# Patient Record
Sex: MALE | Race: WHITE
[De-identification: names, ages, dates, MRNs, and addresses within clinical notes are randomized per-mention and may not be internally consistent; named-entity substitution may affect disease eponyms.]

---

## 2018-09-25 ENCOUNTER — HOSPITAL ENCOUNTER (OUTPATIENT)
Dept: HOSPITAL 62 - ER | Age: 68
LOS: 1 days | Discharge: HOME | End: 2018-09-26
Attending: INTERNAL MEDICINE
Payer: MEDICARE

## 2018-09-25 DIAGNOSIS — Z87.891: ICD-10-CM

## 2018-09-25 DIAGNOSIS — S90.414A: ICD-10-CM

## 2018-09-25 DIAGNOSIS — E78.5: ICD-10-CM

## 2018-09-25 DIAGNOSIS — I25.2: ICD-10-CM

## 2018-09-25 DIAGNOSIS — Z79.84: ICD-10-CM
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
## 2018-09-25 DIAGNOSIS — I10: ICD-10-CM

## 2018-09-25 DIAGNOSIS — Z72.89: ICD-10-CM

## 2018-09-25 DIAGNOSIS — S42.252A: Primary | ICD-10-CM

## 2018-09-25 DIAGNOSIS — G62.9: ICD-10-CM

## 2018-09-25 DIAGNOSIS — E11.9: ICD-10-CM
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
## 2018-09-25 PROCEDURE — 73630 X-RAY EXAM OF FOOT: CPT

## 2018-09-25 PROCEDURE — 84484 ASSAY OF TROPONIN QUANT: CPT

## 2018-09-25 PROCEDURE — 85025 COMPLETE CBC W/AUTO DIFF WBC: CPT

## 2018-09-25 PROCEDURE — 73020 X-RAY EXAM OF SHOULDER: CPT

## 2018-09-25 PROCEDURE — 93010 ELECTROCARDIOGRAM REPORT: CPT

## 2018-09-25 PROCEDURE — 96374 THER/PROPH/DIAG INJ IV PUSH: CPT

## 2018-09-25 PROCEDURE — 80307 DRUG TEST PRSMV CHEM ANLYZR: CPT

## 2018-09-25 PROCEDURE — 73030 X-RAY EXAM OF SHOULDER: CPT

## 2018-09-25 PROCEDURE — 80048 BASIC METABOLIC PNL TOTAL CA: CPT

## 2018-09-25 PROCEDURE — 01620 ANES CLSD PX SHOULDER JOINT: CPT

## 2018-09-25 PROCEDURE — 93005 ELECTROCARDIOGRAM TRACING: CPT

## 2018-09-25 PROCEDURE — 36415 COLL VENOUS BLD VENIPUNCTURE: CPT

## 2018-09-25 PROCEDURE — 99152 MOD SED SAME PHYS/QHP 5/>YRS: CPT

## 2018-09-25 PROCEDURE — 85730 THROMBOPLASTIN TIME PARTIAL: CPT

## 2018-09-25 PROCEDURE — 96375 TX/PRO/DX INJ NEW DRUG ADDON: CPT

## 2018-09-25 PROCEDURE — 23665 CLTX SHO DSLC FX GR HMRL TBR: CPT

## 2018-09-25 PROCEDURE — 85610 PROTHROMBIN TIME: CPT

## 2018-09-25 PROCEDURE — 82553 CREATINE MB FRACTION: CPT

## 2018-09-25 PROCEDURE — 99285 EMERGENCY DEPT VISIT HI MDM: CPT

## 2018-09-25 PROCEDURE — L3650 SO 8 ABD RESTRAINT PRE OTS: HCPCS

## 2018-09-26 VITALS — SYSTOLIC BLOOD PRESSURE: 153 MMHG | DIASTOLIC BLOOD PRESSURE: 95 MMHG

## 2018-09-26 LAB
ADD MANUAL DIFF: NO
ADD MANUAL DIFF: NO
ANION GAP SERPL CALC-SCNC: 13 MMOL/L (ref 5–19)
ANION GAP SERPL CALC-SCNC: 7 MMOL/L (ref 5–19)
APTT BLD: 24.6 SEC (ref 23.5–35.8)
BASOPHILS # BLD AUTO: 0 10^3/UL (ref 0–0.2)
BASOPHILS # BLD AUTO: 0.1 10^3/UL (ref 0–0.2)
BASOPHILS NFR BLD AUTO: 0.3 % (ref 0–2)
BASOPHILS NFR BLD AUTO: 0.7 % (ref 0–2)
BUN SERPL-MCNC: 25 MG/DL (ref 7–20)
BUN SERPL-MCNC: 27 MG/DL (ref 7–20)
CALCIUM: 10 MG/DL (ref 8.4–10.2)
CALCIUM: 9.4 MG/DL (ref 8.4–10.2)
CHLORIDE SERPL-SCNC: 103 MMOL/L (ref 98–107)
CHLORIDE SERPL-SCNC: 106 MMOL/L (ref 98–107)
CK MB SERPL-MCNC: 1.94 NG/ML (ref ?–4.55)
CK MB SERPL-MCNC: 3 NG/ML (ref ?–4.55)
CO2 SERPL-SCNC: 23 MMOL/L (ref 22–30)
CO2 SERPL-SCNC: 25 MMOL/L (ref 22–30)
EOSINOPHIL # BLD AUTO: 0.1 10^3/UL (ref 0–0.6)
EOSINOPHIL # BLD AUTO: 0.2 10^3/UL (ref 0–0.6)
EOSINOPHIL NFR BLD AUTO: 1.5 % (ref 0–6)
EOSINOPHIL NFR BLD AUTO: 2.1 % (ref 0–6)
ERYTHROCYTE [DISTWIDTH] IN BLOOD BY AUTOMATED COUNT: 13.4 % (ref 11.5–14)
ERYTHROCYTE [DISTWIDTH] IN BLOOD BY AUTOMATED COUNT: 13.6 % (ref 11.5–14)
ETHANOL SERPL-MCNC: 113 MG/DL
GLUCOSE SERPL-MCNC: 108 MG/DL (ref 75–110)
GLUCOSE SERPL-MCNC: 122 MG/DL (ref 75–110)
HCT VFR BLD CALC: 36.8 % (ref 37.9–51)
HCT VFR BLD CALC: 38.7 % (ref 37.9–51)
HGB BLD-MCNC: 12.8 G/DL (ref 13.5–17)
HGB BLD-MCNC: 13.4 G/DL (ref 13.5–17)
INR PPP: 0.87
LYMPHOCYTES # BLD AUTO: 1.6 10^3/UL (ref 0.5–4.7)
LYMPHOCYTES # BLD AUTO: 2 10^3/UL (ref 0.5–4.7)
LYMPHOCYTES NFR BLD AUTO: 16.7 % (ref 13–45)
LYMPHOCYTES NFR BLD AUTO: 21 % (ref 13–45)
MCH RBC QN AUTO: 31.2 PG (ref 27–33.4)
MCH RBC QN AUTO: 31.6 PG (ref 27–33.4)
MCHC RBC AUTO-ENTMCNC: 34.5 G/DL (ref 32–36)
MCHC RBC AUTO-ENTMCNC: 34.8 G/DL (ref 32–36)
MCV RBC AUTO: 90 FL (ref 80–97)
MCV RBC AUTO: 91 FL (ref 80–97)
MONOCYTES # BLD AUTO: 0.8 10^3/UL (ref 0.1–1.4)
MONOCYTES # BLD AUTO: 0.8 10^3/UL (ref 0.1–1.4)
MONOCYTES NFR BLD AUTO: 8.1 % (ref 3–13)
MONOCYTES NFR BLD AUTO: 8.6 % (ref 3–13)
NEUTROPHILS # BLD AUTO: 6.6 10^3/UL (ref 1.7–8.2)
NEUTROPHILS # BLD AUTO: 6.8 10^3/UL (ref 1.7–8.2)
NEUTS SEG NFR BLD AUTO: 68.1 % (ref 42–78)
NEUTS SEG NFR BLD AUTO: 72.9 % (ref 42–78)
PLATELET # BLD: 240 10^3/UL (ref 150–450)
PLATELET # BLD: 250 10^3/UL (ref 150–450)
POTASSIUM SERPL-SCNC: 3.8 MMOL/L (ref 3.6–5)
POTASSIUM SERPL-SCNC: 4 MMOL/L (ref 3.6–5)
PROTHROMBIN TIME: 12.3 SEC (ref 11.4–15.4)
RBC # BLD AUTO: 4.06 10^6/UL (ref 4.35–5.55)
RBC # BLD AUTO: 4.28 10^6/UL (ref 4.35–5.55)
SODIUM SERPL-SCNC: 138.3 MMOL/L (ref 137–145)
SODIUM SERPL-SCNC: 139.1 MMOL/L (ref 137–145)
TOTAL CELLS COUNTED % (AUTO): 100 %
TOTAL CELLS COUNTED % (AUTO): 100 %
TROPONIN I SERPL-MCNC: < 0.012 NG/ML
TROPONIN I SERPL-MCNC: < 0.012 NG/ML
WBC # BLD AUTO: 9.4 10^3/UL (ref 4–10.5)
WBC # BLD AUTO: 9.7 10^3/UL (ref 4–10.5)

## 2018-09-26 RX ADMIN — SODIUM CHLORIDE PRN MLS/HR: 9 INJECTION, SOLUTION INTRAVENOUS at 05:57

## 2018-09-26 RX ADMIN — SODIUM CHLORIDE PRN MLS/HR: 9 INJECTION, SOLUTION INTRAVENOUS at 01:49

## 2018-09-26 NOTE — DISCHARGE SUMMARY
Discharge Summary (SDC)





- Discharge


Final Diagnosis: 





Status post close reduction of left shoulder dislocation with greater 

tuberosity fracture.


Date of Surgery: 09/26/18


Discharge Date: 09/26/18


Condition: Good


Treatment or Instructions: 


Patient was discharged from the ER to the OR for closed reduction and then sent 

to the amatory surgery center where he will be discharged home.


Patient instructed to keep the sling on.  Okay to remove but to avoid range of 

motion exercises.  Told to bend forward to able to get the armpit and put on 

insurance.  Okay to do pendulum exercises only.


Patient lives in [place] and will be traveling back and 4 days.  Instructed to 

follow-up with orthopedics in 1-2 weeks.


Instructed to be nonweightbearing in the meantime.


Prescriptions: 


Oxycodone HCl/Acetaminophen [Percocet 5-325 mg Tablet] 1 - 2 tab PO ASDIR PRN #

40 tablet


 PRN Reason: 


Discharge Diet: As Tolerated


Respiratory Treatments at Home: Deep Breathing/Coughing


Discharge Activity: No Lifting/Push/Pulling, Other - Wear the sling at all 

times except for showers.


Home Care Assistance: None Needed


Report the Following to Your Physician Immediately: Shortness of Breath, 

Vomiting, Increase in Pain, Fever over 101 Degrees, Redness, Swelling, Warmth, 

Increased Soreness

## 2018-09-26 NOTE — RADIOLOGY REPORT (SQ)
EXAM DESCRIPTION:  SHOULDER LEFT 2 OR MORE VIEWS; NO CHG FLUORO



COMPLETED DATE/TIME:  9/26/2018 1:31 pm



REASON FOR STUDY:  REDUCTION LEFT SHOULDER



COMPARISON:  Left shoulder films 9/25/2018, 9/26/2018



FLUOROSCOPY TIME:  0.1 minutes

5 digital C-arm images saved to PACS.



TECHNIQUE:  Intra-operative images acquired during surgical procedure to evaluate progress.

NUMBER OF IMAGES: 5 digital C-arm images



LIMITATIONS:  None.



FINDINGS:  5 digital C-arm images demonstrate normal alignment at the left glenohumeral joint.  There
 is an acute fracture at the left humeral head greater tuberosity, minimally displaced.  Limited view
 of the scapula and clavicle in the field of view unremarkable.  Please see the operative report for 
further details



IMPRESSION:  Intra procedural imaging and fluoro



COMMENT:  Quality :  Final reports for procedures using fluoroscopy that document radiation exp
osure indices, or exposure time and number of fluorographic images (if radiation exposure indices are
 not available)

Please consult full operative report of the attending physician for description of the procedure.



TECHNICAL DOCUMENTATION:  JOB ID:  [id number]

 2011 [employer]- All Rights Reserved



Reading location - IP/workstation name: [facility]

## 2018-09-26 NOTE — EKG REPORT
SEVERITY:- OTHERWISE NORMAL ECG -

SINUS RHYTHM

ATRIAL PREMATURE COMPLEX

:

Confirmed by: Lolita Draper MD 26-Sep-2018 07:58:33

## 2018-09-26 NOTE — RADIOLOGY REPORT (SQ)
3 VIEWS OF THE RIGHT FOOT



HISTORY: Fifth toe laceration. Fall.



COMPARISON: None.



FINDINGS/IMPRESSION:



Normal bone mineralization.

No acute fracture or malalignment.

Joint spaces are preserved.

Mild soft tissue swelling of the fifth toe.

## 2018-09-26 NOTE — OPERATIVE REPORT
Operative Report


DATE OF SURGERY: 09/26/18


PREOPERATIVE DIAGNOSIS: Left shoulder fracture dislocation


POSTOPERATIVE DIAGNOSIS: Same


OPERATION: Closed reduction of left shoulder dislocation with greater 

tuberosity fracture


SURGEON: TATA ZAMORA


ANESTHESIA: LMAC


TISSUE REMOVED OR ALTERED: None


COMPLICATIONS: 





None


ESTIMATED BLOOD LOSS: None


INTRAOPERATIVE FINDINGS: As above


PROCEDURE: 





Patient was brought to the operating room and was given MAC sedation.  Once 

sedation was given timeout was done identifying the left shoulder as the 

correct site.  Once anesthesia gave me the okay patient was placed at 30 

degrees head above bed.  We placed a sheet under his armpit and countertraction 

done by nurse.  C-arm was brought in and take images showing that the shoulder 

was still reduced inferiorly and anteriorly.  Greater tuberosity fracture was 

noted.  While the nurse was holding the she I was able to then do manual 

traction and able to reduce it and confirmed on C arm both the AP and scapular 

Y images.  The fracture reduced nicely.  Patient was placed in a sling and 

secured in reimage to ensure that shoulder still in place.  Patient then was 

awoken and then sent to PACU in a stable condition.

## 2018-09-26 NOTE — RADIOLOGY REPORT (SQ)
EXAM DESCRIPTION:  SHOULDER LEFT 2 OR MORE VIEWS; NO CHG FLUORO



COMPLETED DATE/TIME:  9/26/2018 1:31 pm



REASON FOR STUDY:  REDUCTION LEFT SHOULDER



COMPARISON:  Left shoulder films 9/25/2018, 9/26/2018



FLUOROSCOPY TIME:  0.1 minutes

5 digital C-arm images saved to PACS.



TECHNIQUE:  Intra-operative images acquired during surgical procedure to evaluate progress.

NUMBER OF IMAGES: 5 digital C-arm images



LIMITATIONS:  None.



FINDINGS:  5 digital C-arm images demonstrate normal alignment at the left glenohumeral joint.  There
 is an acute fracture at the left humeral head greater tuberosity, minimally displaced.  Limited view
 of the scapula and clavicle in the field of view unremarkable.  Please see the operative report for 
further details



IMPRESSION:  Intra procedural imaging and fluoro



COMMENT:  Quality :  Final reports for procedures using fluoroscopy that document radiation exp
osure indices, or exposure time and number of fluorographic images (if radiation exposure indices are
 not available)

Please consult full operative report of the attending physician for description of the procedure.



TECHNICAL DOCUMENTATION:  JOB ID:  [id number]

 2011 [employer]- All Rights Reserved



Reading location - IP/workstation name: [facility]

## 2018-09-26 NOTE — RADIOLOGY REPORT (SQ)
EXAM DESCRIPTION:  SHOULDER LEFT 1 VIEW



COMPLETED DATE/TIME:  9/26/2018 8:02 am



REASON FOR STUDY:  POST REDUCTION



COMPARISON:  9/26/2018



NUMBER OF VIEWS:  One view



TECHNIQUE:  A single image acquired of the left shoulder.



LIMITATIONS:  Study is limited due to the image projection.



FINDINGS:  MINERALIZATION: Normal.

BONES: The previously described fracture involving the greater tuberosity is again identified.

JOINTS: I cannot confirm interval reduction of the previously described shoulder dislocation on the b
asis of the image obtained.

VISUALIZED LUNGS AND RIBS: No pneumothorax.  No rib fracture.

SOFT TISSUES: No radiopaque foreign body.

OTHER: No other significant finding.



IMPRESSION:  Limited study as noted above.  I cannot confirm interval reduction of the previously aisha
cribed shoulder dislocation on the basis of the image obtained.  Clinical correlation is recommended.
  If clinically warranted a repeat image is recommended.



TECHNICAL DOCUMENTATION:  JOB ID:  [id number]

 2011 [employer]- All Rights Reserved



Reading location - IP/workstation name: [email or web address]

## 2018-09-26 NOTE — ER DOCUMENT REPORT
ED General





- General


Chief Complaint: Shoulder Injury


Stated Complaint: FALL


Time Seen by Provider: 09/25/18 23:39





- HPI


Patient complains to provider of: Left shoulder pain


Notes: 





Patient is visiting from [place] was in the shower tonight after drinking 

alcohol fell after slipping on some water landing on his left shoulder patient 

has an obvious deformity EMS was called patient was transported to ER for 

further evaluation.  Patient has history of hyperlipidemia diabetes states MI 

in the past no stents no bypass surgery hypertension neuropathy.  Patient is on 

metformin for his diabetes.  Patient states that he drinks "enough alcohol the 

night".  Patient otherwise is a no x3 denies any head injuries loss 

consciousness.  Patient otherwise looks to be in mild to moderate pain with his 

left shoulder.





- Related Data


Allergies/Adverse Reactions: 


 





No Known Drug Allergies Allergy (Verified 09/26/18 00:56)


 











Past Medical History





- Social History


Smoking Status: Former Smoker


Chew tobacco use (# tins/day): No


Frequency of alcohol use: Social


Drug Abuse: None


Family History: Reviewed & Not Pertinent


Patient has suicidal ideation: No


Patient has homicidal ideation: No


Renal/ Medical History: Denies: Hx Peritoneal Dialysis





Review of Systems





- Review of Systems


Constitutional: No symptoms reported


EENT: No symptoms reported


Cardiovascular: No symptoms reported


Respiratory: No symptoms reported


Gastrointestinal: No symptoms reported


Genitourinary: No symptoms reported


Male Genitourinary: No symptoms reported


Musculoskeletal: Other - Shoulder pain


Skin: No symptoms reported


Hematologic/Lymphatic: No symptoms reported


Neurological/Psychological: No symptoms reported


-: Yes All other systems reviewed and negative





Physical Exam





- Vital signs


Vitals: 


 











Pulse Ox


 


 95 


 


 09/25/18 23:49











Interpretation: Normal





- General


General appearance: Appears well, Alert





- HEENT


Head: Normocephalic, Atraumatic


Eyes: Normal


Pupils: PERRL





- Respiratory


Respiratory status: No respiratory distress


Chest status: Nontender


Breath sounds: Normal


Chest palpation: Normal





- Cardiovascular


Rhythm: Regular


Heart sounds: Normal auscultation


Murmur: No





- Abdominal


Inspection: Normal


Distension: No distension


Bowel sounds: Normal


Tenderness: Nontender


Organomegaly: No organomegaly





- Back


Back: Normal, Nontender





- Extremities


General upper extremity: Normal color, Normal temperature.  No: Normal 

inspection - Left shoulder with positive deformity and pain to palpation 

decreased range of motion at the shoulder no tenderness at the elbow or wrist.  

Right side unaffected


General lower extremity: Nontender, Normal color, Normal ROM, Normal 

temperature.  No: Normal inspection - Lower extremity is otherwise unaffected 

except for a laceration to the fifth toe on the right





- Neurological


Neuro grossly intact: Yes


Cognition: Normal


Orientation: AAOx4


Speer Coma Scale Eye Opening: Spontaneous


Speer Coma Scale Verbal: Oriented


Speer Coma Scale Motor: Obeys Commands


Katharine Coma Scale Total: 15


Speech: Normal


Motor strength normal: LUE, RUE, LLE, RLE


Sensory: Normal





- Psychological


Associated symptoms: Normal affect, Normal mood





- Skin


Skin Temperature: Warm


Skin Moisture: Dry


Skin Color: Normal





Course





- Re-evaluation


Re-evalutation: 





09/26/18 01:50


Patient coming in x-ray showing dislocation of the shoulder along with a 

fracture.  Because of the fracture site has not at this time to perform any 

manual reduction here in the ER as I expect that reduction fell because of the 

fracture piece for that may cause worsening of the fracture.  The discussed 

this plan with the orthopedist on-call Dr. Varner who agrees request admission 

to the medical service because the patient's underlying past medical history 

discussed with hospitalist will admit with orthopedic on consult





- Vital Signs


Vital signs: 


 











Temp Pulse Resp BP Pulse Ox


 


       23 H  131/88 H  98 


 


       09/26/18 04:00  09/26/18 03:01  09/26/18 04:00














- Laboratory


Result Diagrams: 


 09/26/18 00:30





 09/26/18 00:30


Laboratory results interpreted by me: 


 











  09/26/18 09/26/18





  00:30 00:30


 


RBC  4.28 L 


 


Hgb  13.4 L 


 


BUN   25 H


 


Glucose   122 H














Discharge





- Discharge


Clinical Impression: 


 Dislocation of left shoulder joint, HTN (hypertension), Diabetes, 

Hyperlipidemia, Closed fracture of left proximal humerus, Abrasion of fifth toe

, right





Condition: Good


Disposition: ADMITTED AS INPATIENT


Admitting Provider: Hospitalist - Trip


Unit Admitted: Telemetry

## 2018-09-26 NOTE — RADIOLOGY REPORT (SQ)
EXAM DESCRIPTION: 



XR SHOULDER 2 OR MORE VIEWS



COMPLETED DATE/TME:  09/25/2018 23:39



CLINICAL HISTORY: 



68 years, Male, FALL



COMPARISON:

None.



NUMBER OF VIEWS:

2.0



TECHNIQUE:

Two views of the left shoulder joint were done



LIMITATIONS:

None.



FINDINGS:



There is anterior subcoracoid dislocation of the left humeral

head and presence of a comminuted displaced fracture involving

the greater tuberosity of the proximal left humerus.



Limited evaluation of the left-sided ribs, left scapula and the

left clavicle does not show any acute bony trauma



IMPRESSION:



There is anterior subcoracoid dislocation of the left humeral

head and presence of a comminuted displaced fracture involving

the greater tuberosity of the proximal left humerus.

 



 2011 [employer]- All Rights Reserved